# Patient Record
Sex: MALE | Race: WHITE
[De-identification: names, ages, dates, MRNs, and addresses within clinical notes are randomized per-mention and may not be internally consistent; named-entity substitution may affect disease eponyms.]

---

## 2018-04-05 ENCOUNTER — HOSPITAL ENCOUNTER (EMERGENCY)
Dept: HOSPITAL 12 - ER | Age: 33
Discharge: HOME | End: 2018-04-05
Payer: MEDICARE

## 2018-04-05 VITALS — BODY MASS INDEX: 21.17 KG/M2 | WEIGHT: 165 LBS | HEIGHT: 74 IN

## 2018-04-05 DIAGNOSIS — M72.2: Primary | ICD-10-CM

## 2018-04-05 PROCEDURE — A4663 DIALYSIS BLOOD PRESSURE CUFF: HCPCS

## 2018-04-05 NOTE — NUR
Patient discharged to home in stable conditon.  Written and verbal after care 
instructions given. 

Patient verbalizes understanding of instructions.pt walks in steady gait. bus 
token given per pt request

## 2018-04-10 ENCOUNTER — HOSPITAL ENCOUNTER (EMERGENCY)
Dept: HOSPITAL 12 - ER | Age: 33
LOS: 1 days | Discharge: ANOTHER HOSPITAL | End: 2018-04-11
Payer: MEDICARE

## 2018-04-10 VITALS — BODY MASS INDEX: 20.53 KG/M2 | WEIGHT: 160 LBS | HEIGHT: 74 IN

## 2018-04-10 DIAGNOSIS — F32.9: Primary | ICD-10-CM

## 2018-04-10 DIAGNOSIS — F17.210: ICD-10-CM

## 2018-04-10 DIAGNOSIS — F29: ICD-10-CM

## 2018-04-10 DIAGNOSIS — F15.10: ICD-10-CM

## 2018-04-10 LAB
ALP SERPL-CCNC: 74 U/L (ref 50–136)
ALT SERPL W/O P-5'-P-CCNC: 83 U/L (ref 16–63)
AST SERPL-CCNC: 94 U/L (ref 15–37)
BASOPHILS # BLD AUTO: 0.1 K/UL (ref 0–8)
BASOPHILS NFR BLD AUTO: 1.5 % (ref 0–2)
BILIRUB DIRECT SERPL-MCNC: < 0.1 MG/DL (ref 0–0.2)
BILIRUB SERPL-MCNC: 0.3 MG/DL (ref 0.2–1)
BUN SERPL-MCNC: 11 MG/DL (ref 7–18)
CHLORIDE SERPL-SCNC: 102 MMOL/L (ref 98–107)
CO2 SERPL-SCNC: 28 MMOL/L (ref 21–32)
CREAT SERPL-MCNC: 0.9 MG/DL (ref 0.6–1.3)
EOSINOPHIL # BLD AUTO: 0.2 K/UL (ref 0–0.7)
EOSINOPHIL NFR BLD AUTO: 2.1 % (ref 0–7)
ETHANOL SERPL-MCNC: < 3 MG/DL (ref 0–0)
GLUCOSE SERPL-MCNC: 123 MG/DL (ref 74–106)
HCT VFR BLD AUTO: 38.7 % (ref 36.7–47.1)
HGB BLD-MCNC: 13.3 G/DL (ref 12.5–16.3)
LYMPHOCYTES # BLD AUTO: 2.2 K/UL (ref 20–40)
LYMPHOCYTES NFR BLD AUTO: 28.8 % (ref 20.5–51.5)
MCH RBC QN AUTO: 31.5 UUG (ref 23.8–33.4)
MCHC RBC AUTO-ENTMCNC: 34 G/DL (ref 32.5–36.3)
MCV RBC AUTO: 91.7 FL (ref 73–96.2)
MONOCYTES # BLD AUTO: 0.5 K/UL (ref 2–10)
MONOCYTES NFR BLD AUTO: 6 % (ref 0–11)
NEUTROPHILS # BLD AUTO: 4.7 K/UL (ref 1.8–8.9)
NEUTROPHILS NFR BLD AUTO: 61.6 % (ref 38.5–71.5)
PLATELET # BLD AUTO: 359 K/UL (ref 152–348)
POTASSIUM SERPL-SCNC: 3.2 MMOL/L (ref 3.5–5.1)
RBC # BLD AUTO: 4.22 MIL/UL (ref 4.06–5.63)
WBC # BLD AUTO: 7.6 K/UL (ref 3.6–10.2)
WS STN SPEC: 6.4 G/DL (ref 6.4–8.2)

## 2018-04-10 PROCEDURE — A4663 DIALYSIS BLOOD PRESSURE CUFF: HCPCS

## 2018-04-10 PROCEDURE — A9150 MISC/EXPER NON-PRESCRIPT DRU: HCPCS

## 2018-04-10 PROCEDURE — G0480 DRUG TEST DEF 1-7 CLASSES: HCPCS

## 2018-04-10 NOTE — NUR
ADMITTED TO [facility]  , HAS SUICIDAL ATTEMPTS. SITTER/  
AT BEDSIDE AT ALL TIMES. DR VILLASEÑOR SEEN & EVALUATED PT.

## 2018-04-11 VITALS — DIASTOLIC BLOOD PRESSURE: 53 MMHG | SYSTOLIC BLOOD PRESSURE: 95 MMHG

## 2018-04-11 LAB
AMPHETAMINES UR QL SCN>1000 NG/ML: POSITIVE
BARBITURATES UR QL SCN: NEGATIVE
COCAINE UR QL SCN: NEGATIVE
OPIATES UR QL SCN: NEGATIVE
PCP UR QL SCN>25 NG/ML: NEGATIVE
THC UR QL SCN>50 NG/ML: NEGATIVE

## 2018-04-11 NOTE — NUR
DISCHARGE INSTRUCTIONS GIVEN & WAS UNDERSTOOD. PT. TRANSFERED TO [facility] VIA [facility].